# Patient Record
Sex: FEMALE | Race: BLACK OR AFRICAN AMERICAN | NOT HISPANIC OR LATINO | URBAN - METROPOLITAN AREA
[De-identification: names, ages, dates, MRNs, and addresses within clinical notes are randomized per-mention and may not be internally consistent; named-entity substitution may affect disease eponyms.]

---

## 2021-06-23 ENCOUNTER — APPOINTMENT (OUTPATIENT)
Dept: URBAN - METROPOLITAN AREA CLINIC 236 | Age: 36
Setting detail: DERMATOLOGY
End: 2021-06-24

## 2021-06-23 DIAGNOSIS — L66.8 OTHER CICATRICIAL ALOPECIA: ICD-10-CM

## 2021-06-23 PROBLEM — L65.9 NONSCARRING HAIR LOSS, UNSPECIFIED: Status: ACTIVE | Noted: 2021-06-23

## 2021-06-23 PROCEDURE — 11105 PUNCH BX SKIN EA SEP/ADDL: CPT

## 2021-06-23 PROCEDURE — OTHER COUNSELING: OTHER

## 2021-06-23 PROCEDURE — A4550 SURGICAL TRAYS: HCPCS

## 2021-06-23 PROCEDURE — 11104 PUNCH BX SKIN SINGLE LESION: CPT

## 2021-06-23 PROCEDURE — OTHER TREATMENT REGIMEN: OTHER

## 2021-06-23 PROCEDURE — OTHER BIOPSY BY PUNCH METHOD: OTHER

## 2021-06-23 PROCEDURE — OTHER PRESCRIPTION: OTHER

## 2021-06-23 RX ORDER — MOMETASONE FUROATE 1 MG/ML
SOLUTION TOPICAL
Qty: 1 | Refills: 0 | Status: ERX | COMMUNITY
Start: 2021-06-23

## 2021-06-23 ASSESSMENT — LOCATION DETAILED DESCRIPTION DERM
LOCATION DETAILED: MID-FRONTAL SCALP
LOCATION DETAILED: RIGHT CENTRAL FRONTAL SCALP
LOCATION DETAILED: LEFT CENTRAL FRONTAL SCALP

## 2021-06-23 ASSESSMENT — LOCATION ZONE DERM: LOCATION ZONE: SCALP

## 2021-06-23 ASSESSMENT — LOCATION SIMPLE DESCRIPTION DERM
LOCATION SIMPLE: SCALP
LOCATION SIMPLE: ANTERIOR SCALP

## 2021-06-23 NOTE — PROCEDURE: BIOPSY BY PUNCH METHOD
Home Suture Removal Text: Patient was provided a home suture removal kit and will remove their sutures at home.  If they have any questions or difficulties they will call the office.
Bill For Surgical Tray: yes
Dressing: bandage
Render Post-Care Instructions In Note?: no
Size Of Lesion In Cm (Optional): 0
Punch Size In Mm: 3
Information: Selecting Yes will display possible errors in your note based on the variables you have selected. This validation is only offered as a suggestion for you. PLEASE NOTE THAT THE VALIDATION TEXT WILL BE REMOVED WHEN YOU FINALIZE YOUR NOTE. IF YOU WANT TO FAX A PRELIMINARY NOTE YOU WILL NEED TO TOGGLE THIS TO 'NO' IF YOU DO NOT WANT IT IN YOUR FAXED NOTE.
Anesthesia Volume In Cc (Will Not Render If 0): 0.5
Consent: Written consent was obtained and risks were reviewed including but not limited to scarring, infection, bleeding, scabbing, incomplete removal, nerve damage and allergy to anesthesia.
Notification Instructions: Patient will be notified of biopsy results. However, patient instructed to call the office if not contacted within 2 weeks.
Billing Type: Third-Party Bill
Suture Removal: 14 days
Anesthesia Type: 1% lidocaine with epinephrine
Detail Level: Detailed
Wound Care: Petrolatum
Hemostasis: None
Biopsy Type: H and E
Epidermal Sutures: 4-0 Ethilon
Post-Care Instructions: I reviewed with the patient in detail post-care instructions. Patient is to keep the biopsy site dry overnight, and then apply bacitracin twice daily until healed. Patient may apply hydrogen peroxide soaks to remove any crusting.

## 2021-06-23 NOTE — PROCEDURE: TREATMENT REGIMEN
Initiate Treatment: mometasone 0.1 % topical solution \\nApply once daily to affected areas at bedtime.
Otc Regimen: Minoxidil in the morning
Detail Level: Zone

## 2021-07-07 ENCOUNTER — APPOINTMENT (OUTPATIENT)
Dept: URBAN - METROPOLITAN AREA CLINIC 236 | Age: 36
Setting detail: DERMATOLOGY
End: 2021-07-07

## 2021-07-07 DIAGNOSIS — L65.8 OTHER SPECIFIED NONSCARRING HAIR LOSS: ICD-10-CM

## 2021-07-07 PROCEDURE — OTHER TREATMENT REGIMEN: OTHER

## 2021-07-07 PROCEDURE — OTHER PRESCRIPTION: OTHER

## 2021-07-07 PROCEDURE — OTHER PATHOLOGY DISCUSSION: OTHER

## 2021-07-07 PROCEDURE — OTHER COUNSELING: OTHER

## 2021-07-07 PROCEDURE — 99213 OFFICE O/P EST LOW 20 MIN: CPT

## 2021-07-07 PROCEDURE — OTHER SUTURE REMOVAL (NO GLOBAL PERIOD): OTHER

## 2021-07-07 RX ORDER — MOMETASONE FUROATE 1 MG/ML
SOLUTION TOPICAL
Qty: 1 | Refills: 5 | Status: ERX

## 2021-07-07 ASSESSMENT — LOCATION DETAILED DESCRIPTION DERM
LOCATION DETAILED: POSTERIOR MID-PARIETAL SCALP
LOCATION DETAILED: RIGHT CENTRAL FRONTAL SCALP
LOCATION DETAILED: MID-FRONTAL SCALP

## 2021-07-07 ASSESSMENT — LOCATION SIMPLE DESCRIPTION DERM
LOCATION SIMPLE: SCALP
LOCATION SIMPLE: ANTERIOR SCALP
LOCATION SIMPLE: POSTERIOR SCALP

## 2021-07-07 ASSESSMENT — LOCATION ZONE DERM: LOCATION ZONE: SCALP

## 2021-07-07 NOTE — PROCEDURE: TREATMENT REGIMEN
Continue Regimen: mometasone 0.1 % topical solution \\nApply once daily to affected areas at bedtime.
Otc Regimen: Minoxidil in the morning\\nVitamin D3 daily.\\nBiotin daily.
Detail Level: Zone